# Patient Record
Sex: FEMALE | Race: WHITE | NOT HISPANIC OR LATINO | Employment: OTHER | URBAN - METROPOLITAN AREA
[De-identification: names, ages, dates, MRNs, and addresses within clinical notes are randomized per-mention and may not be internally consistent; named-entity substitution may affect disease eponyms.]

---

## 2017-01-27 ENCOUNTER — APPOINTMENT (OUTPATIENT)
Dept: CARDIAC REHAB | Facility: HOSPITAL | Age: 77
End: 2017-01-27

## 2017-01-30 ENCOUNTER — APPOINTMENT (OUTPATIENT)
Dept: CARDIAC REHAB | Facility: HOSPITAL | Age: 77
End: 2017-01-30

## 2017-02-01 ENCOUNTER — APPOINTMENT (OUTPATIENT)
Dept: CARDIAC REHAB | Facility: HOSPITAL | Age: 77
End: 2017-02-01

## 2017-02-03 ENCOUNTER — APPOINTMENT (OUTPATIENT)
Dept: CARDIAC REHAB | Facility: HOSPITAL | Age: 77
End: 2017-02-03

## 2017-02-06 ENCOUNTER — APPOINTMENT (OUTPATIENT)
Dept: CARDIAC REHAB | Facility: HOSPITAL | Age: 77
End: 2017-02-06

## 2017-02-08 ENCOUNTER — APPOINTMENT (OUTPATIENT)
Dept: CARDIAC REHAB | Facility: HOSPITAL | Age: 77
End: 2017-02-08

## 2017-02-10 ENCOUNTER — APPOINTMENT (OUTPATIENT)
Dept: CARDIAC REHAB | Facility: HOSPITAL | Age: 77
End: 2017-02-10

## 2017-02-13 ENCOUNTER — APPOINTMENT (OUTPATIENT)
Dept: CARDIAC REHAB | Facility: HOSPITAL | Age: 77
End: 2017-02-13

## 2017-02-15 ENCOUNTER — APPOINTMENT (OUTPATIENT)
Dept: CARDIAC REHAB | Facility: HOSPITAL | Age: 77
End: 2017-02-15

## 2017-02-15 ENCOUNTER — OFFICE VISIT (OUTPATIENT)
Dept: CARDIOLOGY | Facility: CLINIC | Age: 77
End: 2017-02-15

## 2017-02-15 VITALS
HEART RATE: 80 BPM | WEIGHT: 197.6 LBS | HEIGHT: 63 IN | SYSTOLIC BLOOD PRESSURE: 116 MMHG | BODY MASS INDEX: 35.01 KG/M2 | DIASTOLIC BLOOD PRESSURE: 72 MMHG

## 2017-02-15 DIAGNOSIS — R53.83 MALAISE AND FATIGUE: ICD-10-CM

## 2017-02-15 DIAGNOSIS — R53.81 MALAISE AND FATIGUE: ICD-10-CM

## 2017-02-15 DIAGNOSIS — E78.00 PURE HYPERCHOLESTEROLEMIA: ICD-10-CM

## 2017-02-15 DIAGNOSIS — I50.23 ACUTE ON CHRONIC SYSTOLIC CONGESTIVE HEART FAILURE (HCC): ICD-10-CM

## 2017-02-15 DIAGNOSIS — I25.810 CORONARY ARTERY DISEASE INVOLVING CORONARY BYPASS GRAFT OF NATIVE HEART WITHOUT ANGINA PECTORIS: Primary | ICD-10-CM

## 2017-02-15 DIAGNOSIS — I10 ESSENTIAL HYPERTENSION: ICD-10-CM

## 2017-02-15 PROCEDURE — 99214 OFFICE O/P EST MOD 30 MIN: CPT | Performed by: INTERNAL MEDICINE

## 2017-02-15 RX ORDER — LORATADINE 10 MG/1
10 TABLET ORAL DAILY
COMMUNITY

## 2017-02-15 NOTE — PROGRESS NOTES
"Subjective:     Encounter Date:02/15/2017      Patient ID: Karen Cheema is a 76 y.o. female.    Chief Complaint: Hyperlipidemia; Follow-up; and Fatigue      PROBLEM LIST:  1. Coronary artery disease  a. Dyspnea, abnormal stress Cardiolite 9/16  b. Cardiac catheter left main and multivessel disease normal LVEF  c. CABG ×5, Fabian, 8/5/16 BERRY to LAD and SVG to diagonal, SVG to OM1, and SVG to PDA and RPL  2. Dyslipidemia  3. Diabetes, type II  4. Asthma  5. Hypothyroidism  6. Multiple drug intolerances  7. Surgical history  a. Cholecystectomy  b. Bladder surgery  c. CABG    History of Present Illness  Patient returns today for follow up with a history of coronary artery disease for new complaints of malaise fatigue.  Since her last visit, the patient is stopped doing cardiac rehabilitation because of \"worked for acute heart\".  She has \"done a lot of reading on my medications and my condition\", and believe she has issues with her heart still.  She has stopped taking her statin again to do myalgias.  Last LDL was 181.  She complains of increasing malaise and fatigue notes she sleeps all the time and can sleep 12-15 hours if left on her opted.  She states this is a new finding.  When she does get up and exert herself, she denies any exertional chest pain or dyspnea.  She has been limited by orthopedic issues.  GERD with increasing activities as musculoskeletal, requiring an emergency room visit..     Allergies   Allergen Reactions   • Fish-Derived Products      \"turned purple and lost my hair\"   • Avandia [Rosiglitazone] Swelling   • Ciprofloxacin Swelling   • Crestor [Rosuvastatin] Myalgia     Muscle stiffness   • Eggs Or Egg-Derived Products Nausea Only   • Iodine      After eating fish, \"turned purple\", lost hair--was told that she would be allergic to contrast dye as well   • Levaquin [Levofloxacin] Nausea And Vomiting and Swelling   • Lipitor [Atorvastatin] Myalgia     Muscle stiffness   • Pravastatin Myalgia   • " "Sulfa Antibiotics Nausea Only         Current Outpatient Prescriptions:   •  acyclovir (ZOVIRAX) 400 MG tablet, Take 400 mg by mouth every 4 (four) hours. Used when needed , Disp: , Rfl:   •  aspirin 325 MG EC tablet, Take 1 tablet by mouth daily., Disp: 90 tablet, Rfl: 3  •  Cholecalciferol (VITAMIN D3) 5000 UNITS tablet, Take 5,000 Units by mouth Daily., Disp: , Rfl:   •  clopidogrel (PLAVIX) 75 MG tablet, Take 1 tablet by mouth daily., Disp: 90 tablet, Rfl: 3  •  levothyroxine (SYNTHROID, LEVOTHROID) 125 MCG tablet, Take 137 mcg by mouth Every Morning., Disp: , Rfl:   •  loratadine (CLARITIN) 10 MG tablet, Take 10 mg by mouth Daily., Disp: , Rfl:   •  metFORMIN (GLUCOPHAGE) 1000 MG tablet, Take 1,000 mg by mouth 2 (two) times a day with meals., Disp: , Rfl:   •  metoprolol tartrate (LOPRESSOR) 25 MG tablet, TAKE 1 TABLET BY MOUTH TWICE DAILY, Disp: 120 tablet, Rfl: 0    The following portions of the patient's history were reviewed and updated as appropriate: allergies, current medications, past family history, past medical history, past social history, past surgical history and problem list.    Review of Systems   Constitution: Positive for malaise/fatigue.   Cardiovascular: Positive for chest pain and dyspnea on exertion.   Hematologic/Lymphatic: Negative for bleeding problem. Does not bruise/bleed easily.   Skin: Negative for rash.   Musculoskeletal: Positive for joint pain. Negative for muscle weakness and myalgias.   Gastrointestinal: Negative for heartburn, nausea and vomiting.   Neurological: Positive for excessive daytime sleepiness and numbness.          Objective:   Blood pressure 116/72, pulse 80, height 63\" (160 cm), weight 197 lb 9.6 oz (89.6 kg).      Physical Exam   Constitutional: She is oriented to person, place, and time. She appears well-developed and well-nourished.   HENT:   Mouth/Throat: Oropharynx is clear and moist.   Neck: No JVD present. Carotid bruit is not present. No thyromegaly " "present.   Cardiovascular: Regular rhythm, S1 normal, S2 normal, normal heart sounds and intact distal pulses.  Exam reveals no gallop, no S3 and no S4.    No murmur heard.  Pulses:       Carotid pulses are 2+ on the right side, and 2+ on the left side.       Radial pulses are 2+ on the right side, and 2+ on the left side.   Pulmonary/Chest: Breath sounds normal.   Abdominal: Soft. Bowel sounds are normal. She exhibits no mass. There is no tenderness.   Musculoskeletal: She exhibits no edema.   Neurological: She is alert and oriented to person, place, and time.   Skin: Skin is warm and dry. No rash noted.       Lab Review:    Procedures        Assessment:   Karen was seen today for hyperlipidemia, follow-up and fatigue.    Diagnoses and all orders for this visit:    Coronary artery disease involving coronary bypass graft of native heart without angina pectoris    Pure hypercholesterolemia    Essential hypertension    Malaise and fatigue        Impression  1. Coronary artery disease, 6 months post CABG  2. Dyslipidemia intolerant to statins  3. Hypertension controlled  4. Malaise fatigue, unclear etiology.  Possibly beta blocker related, as patient thinks.  She is also concerned this is \"anginal equivalent\".    Plan:  1. Exercise Cardiolite myocardial perfusion study  2. Decrease metoprolol to 12.5 minutes twice a day, and after 2-3 weeks discontinue.  3. Given options of trying Livalo/CoQ10 versus trying a new PC SK 9 inhibitor.  For now, we will not add another medicine and attempt to get a PCS K9 inhibitor covered.  4. Revisit in6MO, or sooner as needed.    Anthony Marmolejo MD    "

## 2017-02-17 ENCOUNTER — APPOINTMENT (OUTPATIENT)
Dept: CARDIAC REHAB | Facility: HOSPITAL | Age: 77
End: 2017-02-17

## 2017-02-20 ENCOUNTER — APPOINTMENT (OUTPATIENT)
Dept: CARDIAC REHAB | Facility: HOSPITAL | Age: 77
End: 2017-02-20

## 2017-02-22 ENCOUNTER — OUTSIDE FACILITY SERVICE (OUTPATIENT)
Dept: CARDIOLOGY | Facility: CLINIC | Age: 77
End: 2017-02-22

## 2017-02-22 ENCOUNTER — APPOINTMENT (OUTPATIENT)
Dept: CARDIAC REHAB | Facility: HOSPITAL | Age: 77
End: 2017-02-22

## 2017-02-22 PROCEDURE — 93018 CV STRESS TEST I&R ONLY: CPT | Performed by: INTERNAL MEDICINE

## 2017-02-22 PROCEDURE — 78452 HT MUSCLE IMAGE SPECT MULT: CPT | Performed by: INTERNAL MEDICINE

## 2017-03-10 RX ORDER — CLOPIDOGREL BISULFATE 75 MG/1
TABLET ORAL
Qty: 30 TABLET | Refills: 0 | Status: SHIPPED | OUTPATIENT
Start: 2017-03-10 | End: 2017-04-09 | Stop reason: SDUPTHER

## 2017-03-10 RX ORDER — ASPIRIN 325 MG
TABLET, DELAYED RELEASE (ENTERIC COATED) ORAL
Qty: 30 TABLET | Refills: 0 | Status: SHIPPED | OUTPATIENT
Start: 2017-03-10 | End: 2017-04-10 | Stop reason: SDUPTHER

## 2017-04-10 RX ORDER — CLOPIDOGREL BISULFATE 75 MG/1
TABLET ORAL
Qty: 30 TABLET | Refills: 11 | Status: SHIPPED | OUTPATIENT
Start: 2017-04-10 | End: 2018-02-02 | Stop reason: SDUPTHER

## 2017-04-12 RX ORDER — ASPIRIN 325 MG
TABLET, DELAYED RELEASE (ENTERIC COATED) ORAL
Qty: 30 TABLET | Refills: 0 | Status: SHIPPED | OUTPATIENT
Start: 2017-04-12 | End: 2017-05-05 | Stop reason: SDUPTHER

## 2017-05-08 RX ORDER — ASPIRIN 325 MG
TABLET, DELAYED RELEASE (ENTERIC COATED) ORAL
Qty: 30 TABLET | Refills: 0 | Status: SHIPPED | OUTPATIENT
Start: 2017-05-08 | End: 2018-02-12 | Stop reason: SDUPTHER

## 2017-08-23 ENCOUNTER — OFFICE VISIT (OUTPATIENT)
Dept: CARDIOLOGY | Facility: CLINIC | Age: 77
End: 2017-08-23

## 2017-08-23 VITALS
SYSTOLIC BLOOD PRESSURE: 138 MMHG | DIASTOLIC BLOOD PRESSURE: 82 MMHG | WEIGHT: 202.6 LBS | BODY MASS INDEX: 35.9 KG/M2 | HEIGHT: 63 IN | HEART RATE: 59 BPM

## 2017-08-23 DIAGNOSIS — R53.83 MALAISE AND FATIGUE: ICD-10-CM

## 2017-08-23 DIAGNOSIS — R53.81 MALAISE AND FATIGUE: ICD-10-CM

## 2017-08-23 DIAGNOSIS — E78.00 PURE HYPERCHOLESTEROLEMIA: Primary | ICD-10-CM

## 2017-08-23 DIAGNOSIS — I10 ESSENTIAL HYPERTENSION: ICD-10-CM

## 2017-08-23 DIAGNOSIS — I25.810 CORONARY ARTERY DISEASE INVOLVING CORONARY BYPASS GRAFT OF NATIVE HEART WITHOUT ANGINA PECTORIS: ICD-10-CM

## 2017-08-23 PROCEDURE — 99214 OFFICE O/P EST MOD 30 MIN: CPT | Performed by: INTERNAL MEDICINE

## 2017-08-23 RX ORDER — AMPICILLIN TRIHYDRATE 250 MG
500 CAPSULE ORAL DAILY
COMMUNITY
End: 2018-08-29

## 2017-08-23 RX ORDER — UBIDECARENONE 100 MG
200 CAPSULE ORAL DAILY
COMMUNITY

## 2017-08-23 NOTE — PROGRESS NOTES
"Subjective:     Encounter Date:2017      Patient ID: Karen Cheema is a 77 y.o. female.    Chief Complaint:Coronary Artery Disease; abnormal stress test; and Rapid Heart Rate (during the night sometimes)    PROBLEM LIST:  1. Coronary artery disease  a. Dyspnea, abnormal stress Cardiolite   b. Cardiac catheter left main and multivessel disease normal LVEF  c. CABG ×5, Fabian, 16 BERRY to LAD and SVG to diagonal, SVG to OM1, and SVG to PDA and RPL  2. Dyslipidemia  3. Diabetes, type II  4. Asthma  5. Hypothyroidism  6. Rosacea  7. Multiple drug intolerances  8. Surgical history  a. Cholecystectomy  b. Bladder surgery  c. CABG  d.  section  e. Hysterectomy  f. Tonsillectomy         Allergies   Allergen Reactions   • Fish-Derived Products      \"turned purple and lost my hair\"   • Avandia [Rosiglitazone] Swelling   • Ciprofloxacin Swelling   • Crestor [Rosuvastatin] Myalgia     Muscle stiffness   • Eggs Or Egg-Derived Products Nausea Only   • Iodine      After eating fish, \"turned purple\", lost hair--was told that she would be allergic to contrast dye as well   • Levaquin [Levofloxacin] Nausea And Vomiting and Swelling   • Lipitor [Atorvastatin] Myalgia     Muscle stiffness   • Pravastatin Myalgia   • Sulfa Antibiotics Nausea Only         Current Outpatient Prescriptions:   •  acyclovir (ZOVIRAX) 400 MG tablet, Take 400 mg by mouth every 4 (four) hours. Used when needed , Disp: , Rfl:   •  aspirin  MG tablet, TAKE 1 TABLET BY MOUTH EVERY DAY, Disp: 30 tablet, Rfl: 0  •  Cholecalciferol (VITAMIN D3) 5000 UNITS tablet, Take 5,000 Units by mouth Daily., Disp: , Rfl:   •  Cinnamon 500 MG capsule, Take 500 mg by mouth Daily., Disp: , Rfl:   •  clopidogrel (PLAVIX) 75 MG tablet, TAKE 1 TABLET BY MOUTH EVERY DAY, Disp: 30 tablet, Rfl: 11  •  coenzyme Q10 100 MG capsule, Take 200 mg by mouth Daily., Disp: , Rfl:   •  levothyroxine (SYNTHROID, LEVOTHROID) 125 MCG tablet, Take 137 mcg by mouth Every " "Morning., Disp: , Rfl:   •  loratadine (CLARITIN) 10 MG tablet, Take 10 mg by mouth Daily., Disp: , Rfl:   •  metFORMIN (GLUCOPHAGE) 1000 MG tablet, Take 1,000 mg by mouth 2 (two) times a day with meals., Disp: , Rfl:   •  metoprolol tartrate (LOPRESSOR) 25 MG tablet, Take 0.5 tablets by mouth 2 (Two) Times a Day., Disp: 45 tablet, Rfl: 3        History of Present Illness    Annual follow-up one year status post CABG.  She underwent a stress perfusion study 6 months ago which was normal.  She still continued complains of malaise fatigue is her primary complaint.  She has not been particularly active which she says due to \"being tired all the time\".  Denies any other sleep apnea symptoms.  She has had difficulties getting her [employer] [facility] facility noticed a prudent approved by her insurance.  5 apnea PND dizziness presyncope or syncope    The following portions of the patient's history were reviewed and updated as appropriate: allergies, current medications, past family history, past medical history, past social history, past surgical history and problem list.      Social History   Substance Use Topics   • Smoking status: Never Smoker   • Smokeless tobacco: Never Used   • Alcohol use No         Review of Systems   Constitution: Positive for malaise/fatigue.   Cardiovascular: Negative.    Respiratory: Negative.    Hematologic/Lymphatic: Negative for bleeding problem. Does not bruise/bleed easily.   Skin: Negative for rash.   Musculoskeletal: Positive for joint pain. Negative for muscle weakness and myalgias.   Gastrointestinal: Negative for heartburn, nausea and vomiting.   Neurological: Negative.           Objective:    height is 63\" (160 cm) and weight is 202 lb 9.6 oz (91.9 kg). Her blood pressure is 138/82 and her pulse is 59.         Physical Exam   Constitutional: She is oriented to person, place, and time. She appears well-developed and well-nourished.   HENT:   Mouth/Throat: Oropharynx is clear and moist.   Neck: No " JVD present. Carotid bruit is not present.   Cardiovascular: Regular rhythm, S1 normal, S2 normal, normal heart sounds and intact distal pulses.  Exam reveals no gallop, no S3 and no S4.    No murmur heard.  Pulses:       Carotid pulses are 2+ on the right side, and 2+ on the left side.       Radial pulses are 2+ on the right side, and 2+ on the left side.   Pulmonary/Chest: Breath sounds normal.   Abdominal: Soft. Bowel sounds are normal. She exhibits no mass. There is no tenderness.   Musculoskeletal: She exhibits no edema.   Neurological: She is alert and oriented to person, place, and time.   Skin: Skin is warm and dry. No rash noted.       Procedures          Assessment:   Assessment/Plan      Karen was seen today for coronary artery disease, abnormal stress test and rapid heart rate.    Diagnoses and all orders for this visit:    Pure hypercholesterolemia    Essential hypertension    Coronary artery disease involving coronary bypass graft of native heart without angina pectoris    Malaise and fatigue      1.  One year status post CABG.  No angina.  2.  Dyslipidemia.  Intolerant to all statins.  PCS K9 is been improved, she has somewhat confused as to how to actually obtain the medicine however.  We have instructed her how to obtain this medicine from the specialty pharmacy.  3.  Malaise fatigue unclear etiology, discussed the possible sleep apnea evaluation which she defers at this time.  We will stop her metoprolol.       Shi MENDES scribed portions of this dictation for  Dr. Marmolejo.  I, Anthony Marmolejo MD, personally performed the services described in this documentation as scribed by the above individual in my presence, and it is both accurate and complete  Dictated utilizing Dragon dictation

## 2017-12-19 ENCOUNTER — TELEPHONE (OUTPATIENT)
Dept: CARDIOLOGY | Facility: CLINIC | Age: 77
End: 2017-12-19

## 2017-12-19 NOTE — TELEPHONE ENCOUNTER
Patient called and says that since on the Praluent she has had trouble keeping her HgA1c down.   She used to be able to keep it around 6 and now it goes up to 8 and she has a really hard time getting it down.   Wonders if she can take the shot 1xmonth instead of 2 times X month.  Just had lab work done and is sending it to me for you to see.

## 2017-12-21 NOTE — TELEPHONE ENCOUNTER
Patient informed or Jose Marmolejo's response to her question about her sugar being up on the Praluent and to change her shots to 1x month instead of 2 times and he says that they are unrelated and to continue on the shots 2xmonth.  She still feels that they are related.

## 2018-02-02 RX ORDER — CLOPIDOGREL BISULFATE 75 MG/1
TABLET ORAL
Qty: 30 TABLET | Refills: 1 | Status: SHIPPED | OUTPATIENT
Start: 2018-02-02 | End: 2018-02-08 | Stop reason: SDUPTHER

## 2018-02-07 ENCOUNTER — OFFICE VISIT (OUTPATIENT)
Dept: CARDIOLOGY | Facility: CLINIC | Age: 78
End: 2018-02-07

## 2018-02-07 VITALS
DIASTOLIC BLOOD PRESSURE: 84 MMHG | SYSTOLIC BLOOD PRESSURE: 122 MMHG | BODY MASS INDEX: 37.91 KG/M2 | HEART RATE: 77 BPM | HEIGHT: 62 IN | WEIGHT: 206 LBS

## 2018-02-07 DIAGNOSIS — I25.118 CORONARY ARTERY DISEASE INVOLVING NATIVE CORONARY ARTERY OF NATIVE HEART WITH OTHER FORM OF ANGINA PECTORIS (HCC): ICD-10-CM

## 2018-02-07 DIAGNOSIS — I10 ESSENTIAL HYPERTENSION: ICD-10-CM

## 2018-02-07 DIAGNOSIS — R07.2 PRECORDIAL PAIN: Primary | ICD-10-CM

## 2018-02-07 DIAGNOSIS — E78.5 DYSLIPIDEMIA: ICD-10-CM

## 2018-02-07 PROCEDURE — 99214 OFFICE O/P EST MOD 30 MIN: CPT | Performed by: NURSE PRACTITIONER

## 2018-02-07 RX ORDER — VITAMIN B COMPLEX
CAPSULE ORAL DAILY
COMMUNITY
End: 2019-09-11

## 2018-02-07 NOTE — PROGRESS NOTES
"Subjective:     Encounter Date:2018    Primary Care Physician: VAUGHN Layton      Patient ID: Karen Cheema is a 77 y.o. female.    Chief Complaint:Coronary Artery Disease; abnormal stress test; Hypertension; and Shortness of Breath    PROBLEM LIST:  1. Coronary artery disease  a. Dyspnea, abnormal stress Cardiolite   b. Cardiac catheter left main and multivessel disease normal LVEF  c. CABG ×5, Fabian, 16 BERRY to LAD and SVG to diagonal, SVG to OM1, and SVG to PDA and RPL  2. Dyslipidemia  3. Diabetes, type II  4. Asthma  5. Hypothyroidism  6. Rosacea  7. Multiple drug intolerances  8. Surgical history  a. Cholecystectomy  b. Bladder surgery  c. CABG  d.  section  e. Hysterectomy  f. Tonsillectomy     Allergies   Allergen Reactions   • Fish-Derived Products      \"turned purple and lost my hair\"   • Avandia [Rosiglitazone] Swelling   • Ciprofloxacin Swelling   • Crestor [Rosuvastatin] Myalgia     Muscle stiffness   • Eggs Or Egg-Derived Products Nausea Only   • Iodine      After eating fish, \"turned purple\", lost hair--was told that she would be allergic to contrast dye as well   • Levaquin [Levofloxacin] Nausea And Vomiting and Swelling   • Lipitor [Atorvastatin] Myalgia     Muscle stiffness   • Pravastatin Myalgia   • Sulfa Antibiotics Nausea Only         Current Outpatient Prescriptions:   •  aspirin  MG tablet, TAKE 1 TABLET BY MOUTH EVERY DAY, Disp: 30 tablet, Rfl: 0  •  B Complex Vitamins (VITAMIN B COMPLEX) capsule capsule, Take  by mouth Daily., Disp: , Rfl:   •  Cholecalciferol (VITAMIN D3) 5000 UNITS tablet, Take 5,000 Units by mouth Daily., Disp: , Rfl:   •  clopidogrel (PLAVIX) 75 MG tablet, TAKE ONE TABLET BY MOUTH DAILY, Disp: 30 tablet, Rfl: 1  •  coenzyme Q10 100 MG capsule, Take 200 mg by mouth Daily., Disp: , Rfl:   •  levothyroxine (SYNTHROID, LEVOTHROID) 125 MCG tablet, Take 137 mcg by mouth Every Morning., Disp: , Rfl:   •  loratadine (CLARITIN) 10 MG " tablet, Take 10 mg by mouth Daily., Disp: , Rfl:   •  metFORMIN (GLUCOPHAGE) 1000 MG tablet, Take 1,000 mg by mouth 2 (two) times a day with meals., Disp: , Rfl:   •  acyclovir (ZOVIRAX) 400 MG tablet, Take 400 mg by mouth every 4 (four) hours. Used when needed , Disp: , Rfl:   •  Cinnamon 500 MG capsule, Take 500 mg by mouth Daily., Disp: , Rfl:   •  metoprolol tartrate (LOPRESSOR) 25 MG tablet, Take 0.5 tablets by mouth 2 (Two) Times a Day., Disp: 45 tablet, Rfl: 3        History of Present Illness    Patient is a 77-year-old  female who is following up today with complaints of chest pain.  She has previous history of coronary artery bypass grafting which was performed in August 2016.  She notes that couple months ago her diabetes medications were changed.  After this she began to have reaction of upper body edema.  She experienced associated chest tightness as well as shortness of breath.  This medication was eventually discontinued.  Since that time she notes that her chest discomfort has been improving.  She also notes that her shortness of breath has been improving.  This does not necessarily feel he can previous angina but she is concerned about her bypass grafts secondary to the discomfort.  No syncope, near-syncope, or worsening edema.  States that she is compliant with her medications.  She also notes that she has started going to the gym 6 days a week.  She is been primarily doing cardio workouts.  She has questions about beginning some upper body resistance training.    The following portions of the patient's history were reviewed and updated as appropriate: allergies, current medications, past family history, past medical history, past social history, past surgical history and problem list.    Social History   Substance Use Topics   • Smoking status: Never Smoker   • Smokeless tobacco: Never Used   • Alcohol use No         Review of Systems   Constitution: Negative.   Cardiovascular: Positive  "for chest pain.   Respiratory: Positive for shortness of breath.    Hematologic/Lymphatic: Negative for bleeding problem. Does not bruise/bleed easily.   Skin: Negative for rash.   Musculoskeletal: Negative for muscle weakness and myalgias.   Gastrointestinal: Negative for heartburn, nausea and vomiting.   Neurological: Negative.           Objective:    height is 157.5 cm (62\") and weight is 93.4 kg (206 lb). Her blood pressure is 122/84 and her pulse is 77.         Physical Exam   Constitutional: She is oriented to person, place, and time. She appears well-developed and well-nourished. No distress.   Neck: No JVD present. No tracheal deviation present.   No bruit auscultated bilaterally.   Cardiovascular: Normal rate, regular rhythm and normal heart sounds.  Exam reveals no friction rub.    No murmur heard.  Pulmonary/Chest: Effort normal and breath sounds normal. No respiratory distress.   Abdominal: Soft. Bowel sounds are normal. There is no tenderness.   Musculoskeletal: She exhibits no edema or deformity.   Neurological: She is alert and oriented to person, place, and time.   Skin: Skin is warm and dry.       Procedures          Assessment:   Assessment/Plan      Karen was seen today for coronary artery disease, abnormal stress test, hypertension and shortness of breath.    Diagnoses and all orders for this visit:    Precordial pain, Noted history of coronary disease.  -     Stress Test With Myocardial Perfusion (1 Day); Future    Coronary artery disease involving native coronary artery of native heart with other form of angina pectoris  -     Stress Test With Myocardial Perfusion (1 Day); Future    Essential hypertension, controlled.    Dyslipidemia, statin intolerant.  Most recent LDL in December 2017 of 69 on Praluent.      Plan:    At this time we'll make no changes in the patient's medication regimen.  To further evaluate her recent symptoms we'll train a myocardial perfusion study at her convenience.  " We'll see her back in 6 months time or sooner if needed.       Shi MENDES     Dictated utilizing Dragon dictation

## 2018-02-08 RX ORDER — CLOPIDOGREL BISULFATE 75 MG/1
75 TABLET ORAL DAILY
Qty: 30 TABLET | Refills: 1 | Status: SHIPPED | OUTPATIENT
Start: 2018-02-08 | End: 2018-02-12 | Stop reason: SDUPTHER

## 2018-02-12 RX ORDER — ASPIRIN 325 MG
325 TABLET, DELAYED RELEASE (ENTERIC COATED) ORAL DAILY
Qty: 90 TABLET | Refills: 3 | Status: SHIPPED | OUTPATIENT
Start: 2018-02-12 | End: 2018-08-29 | Stop reason: SDUPTHER

## 2018-02-12 RX ORDER — CLOPIDOGREL BISULFATE 75 MG/1
75 TABLET ORAL DAILY
Qty: 90 TABLET | Refills: 3 | Status: SHIPPED | OUTPATIENT
Start: 2018-02-12 | End: 2018-08-13 | Stop reason: SDUPTHER

## 2018-02-13 ENCOUNTER — TELEPHONE (OUTPATIENT)
Dept: CARDIOLOGY | Facility: CLINIC | Age: 78
End: 2018-02-13

## 2018-02-13 NOTE — TELEPHONE ENCOUNTER
Patient called and reports that there was a medication at the pharmacy that she wasn't familiar with.  Called patient back and she states she has not been taking metoprolol.  She questions if she needs it, I advised her that Dr. Marmolejo is under the impression she is on it.  I advised her to check her blood pressure for 7 days and to call back with those readings and we will go from there.  She verbalized understanding and will call back with those readings.

## 2018-02-28 ENCOUNTER — OUTSIDE FACILITY SERVICE (OUTPATIENT)
Dept: CARDIOLOGY | Facility: CLINIC | Age: 78
End: 2018-02-28

## 2018-02-28 PROCEDURE — 78452 HT MUSCLE IMAGE SPECT MULT: CPT | Performed by: INTERNAL MEDICINE

## 2018-02-28 PROCEDURE — 93018 CV STRESS TEST I&R ONLY: CPT | Performed by: INTERNAL MEDICINE

## 2018-03-06 ENCOUNTER — TELEPHONE (OUTPATIENT)
Dept: CARDIOLOGY | Facility: CLINIC | Age: 78
End: 2018-03-06

## 2018-08-13 RX ORDER — CLOPIDOGREL BISULFATE 75 MG/1
TABLET ORAL
Qty: 30 TABLET | Refills: 0 | Status: SHIPPED | OUTPATIENT
Start: 2018-08-13 | End: 2018-08-29 | Stop reason: SDUPTHER

## 2018-08-13 RX ORDER — CLOPIDOGREL BISULFATE 75 MG/1
75 TABLET ORAL DAILY
Qty: 90 TABLET | Refills: 3 | Status: SHIPPED | OUTPATIENT
Start: 2018-08-13 | End: 2018-08-29 | Stop reason: SDUPTHER

## 2018-08-13 NOTE — ADDENDUM NOTE
Addended by: HIPOLITO SAHA on: 8/13/2018 05:05 PM     Modules accepted: Level of Service, SmartSet

## 2018-08-13 NOTE — ADDENDUM NOTE
Addended by: HIPOLITO SAHA on: 8/13/2018 05:07 PM     Modules accepted: Level of Service, SmartSet

## 2018-08-29 ENCOUNTER — OFFICE VISIT (OUTPATIENT)
Dept: CARDIOLOGY | Facility: CLINIC | Age: 78
End: 2018-08-29

## 2018-08-29 VITALS
HEIGHT: 61 IN | HEART RATE: 67 BPM | WEIGHT: 193 LBS | DIASTOLIC BLOOD PRESSURE: 80 MMHG | SYSTOLIC BLOOD PRESSURE: 120 MMHG | BODY MASS INDEX: 36.44 KG/M2

## 2018-08-29 DIAGNOSIS — I10 ESSENTIAL HYPERTENSION: ICD-10-CM

## 2018-08-29 DIAGNOSIS — E78.00 PURE HYPERCHOLESTEROLEMIA: ICD-10-CM

## 2018-08-29 DIAGNOSIS — I25.810 CORONARY ARTERY DISEASE INVOLVING CORONARY BYPASS GRAFT OF NATIVE HEART WITHOUT ANGINA PECTORIS: Primary | ICD-10-CM

## 2018-08-29 PROCEDURE — 99214 OFFICE O/P EST MOD 30 MIN: CPT | Performed by: INTERNAL MEDICINE

## 2018-08-29 RX ORDER — ASPIRIN 325 MG
325 TABLET, DELAYED RELEASE (ENTERIC COATED) ORAL DAILY
Qty: 90 TABLET | Refills: 3 | Status: SHIPPED | OUTPATIENT
Start: 2018-08-29 | End: 2019-09-11 | Stop reason: DRUGHIGH

## 2018-08-29 RX ORDER — CLOPIDOGREL BISULFATE 75 MG/1
75 TABLET ORAL DAILY
Qty: 90 TABLET | Refills: 3 | Status: SHIPPED | OUTPATIENT
Start: 2018-08-29 | End: 2019-10-20 | Stop reason: SDUPTHER

## 2018-08-29 RX ORDER — LEVOTHYROXINE SODIUM 137 UG/1
137 TABLET ORAL DAILY
COMMUNITY

## 2018-08-29 NOTE — PROGRESS NOTES
"Subjective:     Encounter Date:2018      Patient ID: Karen Cheema is a 78 y.o. female.    Chief Complaint: Coronary artery disease involving coronary bypass graft of n      PROBLEM LIST:  1. Coronary artery disease  a. Dyspnea, abnormal stress Cardiolite   b. Cardiac catheter left main and multivessel disease normal LVEF  c. CABG ×5, Fabian, 16 BERRY to LAD and SVG to diagonal, SVG to OM1, and SVG to PDA and RPL  2. Dyslipidemia  3. Diabetes, type II  4. Asthma  5. Hypothyroidism  6. Rosacea  7. Multiple drug intolerances  8. Surgical history  a. Cholecystectomy  b. Bladder surgery  c. CABG  d.  section  e. Hysterectomy  f. Tonsillectomy   1.     History of Present Illness  Patient returns today for follow up with a history of Annual follow-up for coronary artery disease.  She is 2 years post CABG and overall feels well.  She is active, has no chest pain shortness breath orthopnea dizziness presyncope palpitations edema or claudication.  Her diabetes is better controlled.  Her lipids are well-controlled on her PCS K9 inhibitor..     Allergies   Allergen Reactions   • Fish-Derived Products      \"turned purple and lost my hair\"   • Avandia [Rosiglitazone] Swelling   • Ciprofloxacin Swelling   • Crestor [Rosuvastatin] Myalgia     Muscle stiffness   • Eggs Or Egg-Derived Products Nausea Only   • Glyburide Swelling   • Iodine      After eating fish, \"turned purple\", lost hair--was told that she would be allergic to contrast dye as well   • Levaquin [Levofloxacin] Nausea And Vomiting and Swelling   • Lipitor [Atorvastatin] Myalgia     Muscle stiffness   • Pravastatin Myalgia   • Sulfa Antibiotics Nausea Only         Current Outpatient Prescriptions:   •  aspirin  MG tablet, Take 1 tablet by mouth Daily., Disp: 90 tablet, Rfl: 3  •  B Complex Vitamins (VITAMIN B COMPLEX) capsule capsule, Take  by mouth Daily., Disp: , Rfl:   •  Cholecalciferol (VITAMIN D3) 5000 UNITS tablet, Take 5,000 Units by " "mouth Daily., Disp: , Rfl:   •  clopidogrel (PLAVIX) 75 MG tablet, Take 1 tablet by mouth Daily., Disp: 90 tablet, Rfl: 3  •  coenzyme Q10 100 MG capsule, Take 200 mg by mouth Daily., Disp: , Rfl:   •  levothyroxine (SYNTHROID, LEVOTHROID) 137 MCG tablet, Take 137 mcg by mouth Daily., Disp: , Rfl:   •  loratadine (CLARITIN) 10 MG tablet, Take 10 mg by mouth Daily., Disp: , Rfl:   •  metFORMIN (GLUCOPHAGE) 1000 MG tablet, Take 1,000 mg by mouth 2 (two) times a day with meals., Disp: , Rfl:   •  metoprolol tartrate (LOPRESSOR) 25 MG tablet, Take 0.5 tablets by mouth Every 12 (Twelve) Hours. (Patient taking differently: Take 12.5 mg by mouth Daily.), Disp: 90 tablet, Rfl: 2  •  PRALUENT 75 MG/ML solution pen-injector, INJECT ONE PEN UNDER THE SKIN EVERY 2 WEEKS(14 DAYS) AS DIRECTED, Disp: 2 pen, Rfl: 11    The following portions of the patient's history were reviewed and updated as appropriate: allergies, current medications, past family history, past medical history, past social history, past surgical history and problem list.    Review of Systems   Constitution: Negative.   HENT: Positive for ear pain.    Cardiovascular: Negative.    Respiratory: Negative.    Hematologic/Lymphatic: Negative for bleeding problem. Does not bruise/bleed easily.   Skin: Negative for rash.   Musculoskeletal: Negative for muscle weakness and myalgias.   Gastrointestinal: Negative for heartburn, nausea and vomiting.   Neurological: Negative.           Objective:   Blood pressure 120/80, pulse 67, height 154.9 cm (61\"), weight 87.5 kg (193 lb).      Physical Exam   Constitutional: She is oriented to person, place, and time. She appears well-developed and well-nourished.   HENT:   Mouth/Throat: Oropharynx is clear and moist.   Neck: No JVD present. Carotid bruit is not present. No thyromegaly present.   Cardiovascular: Regular rhythm, S1 normal, S2 normal, normal heart sounds and intact distal pulses.  Exam reveals no gallop, no S3 and no S4. "    No murmur heard.  Pulses:       Carotid pulses are 2+ on the right side, and 2+ on the left side.       Radial pulses are 2+ on the right side, and 2+ on the left side.   Pulmonary/Chest: Breath sounds normal.   Abdominal: Soft. Bowel sounds are normal. She exhibits no mass. There is no tenderness.   Musculoskeletal: She exhibits no edema.   Neurological: She is alert and oriented to person, place, and time.   Skin: Skin is warm and dry. No rash noted.       Lab Review:    Procedures        Assessment:   Karen was seen today for coronary artery disease involving coronary bypass graft of n.    Diagnoses and all orders for this visit:    Coronary artery disease involving coronary bypass graft of native heart without angina pectoris    Pure hypercholesterolemia    Essential hypertension        Impression  1. Coronary artery disease, 2 years post CABG.  Stable no angina  Hyperlipidemia.  LDL 66 on PSK9 inhibitor  3.  Hypertension controlled  4.  Preoperative shoulder surgery, low cardiovascular risk.      Plan:  1. Patient low cardiovascular risk for upcoming shoulder surgery may proceed as planned.  If necessary may hold Plavix for 5-7 days perioperatively  2. Otherwise continue current medical therapy  3. Revisit in 12 MO, or sooner as needed.    Anthony Marmolejo MD

## 2019-01-24 ENCOUNTER — TELEPHONE (OUTPATIENT)
Dept: CARDIOLOGY | Facility: CLINIC | Age: 79
End: 2019-01-24

## 2019-01-24 NOTE — TELEPHONE ENCOUNTER
Called to see if patient has had recent lipid panel there, left message.  Left VM for patient requesting info if she has had recent lipid panel.  These calls are in response to communication from [facility] for renewal of Praluent.

## 2019-09-10 NOTE — PROGRESS NOTES
"Subjective:     Encounter Date:2019    Primary Care Physician: Darcy Parry APRN      Patient ID: Karen Cheema is a 79 y.o. female.    Chief Complaint:Follow-up    PROBLEM LIST:  1. Coronary artery disease  a. Dyspnea, abnormal stress Cardiolite   b. Cardiac catheter left main and multivessel disease normal LVEF  c. CABG ×5, Fabian, 16 BERRY to LAD and SVG to diagonal, SVG to OM1, and SVG to PDA and RPL  d.  MPS negative for ischemia  2. Dyslipidemia  3. Diabetes, type II  4. Asthma  5. Hypothyroidism  6. Rosacea  7. Multiple drug intolerances  8. Surgical history  a. Cholecystectomy  b. Bladder surgery  c. CABG  d.  section  e. Hysterectomy        Allergies   Allergen Reactions   • Fish-Derived Products      \"turned purple and lost my hair\"   • Avandia [Rosiglitazone] Swelling   • Ciprofloxacin Swelling   • Crestor [Rosuvastatin] Myalgia     Muscle stiffness   • Eggs Or Egg-Derived Products Nausea Only   • Glyburide Swelling   • Iodine      After eating fish, \"turned purple\", lost hair--was told that she would be allergic to contrast dye as well   • Levaquin [Levofloxacin] Nausea And Vomiting and Swelling   • Lipitor [Atorvastatin] Myalgia     Muscle stiffness   • Pravastatin Myalgia   • Sulfa Antibiotics Nausea Only         Current Outpatient Medications:   •  aspirin  MG tablet, Take 1 tablet by mouth Daily., Disp: 90 tablet, Rfl: 3  •  Cholecalciferol (VITAMIN D3) 5000 UNITS tablet, Take 5,000 Units by mouth Daily., Disp: , Rfl:   •  clopidogrel (PLAVIX) 75 MG tablet, Take 1 tablet by mouth Daily., Disp: 90 tablet, Rfl: 3  •  coenzyme Q10 100 MG capsule, Take 200 mg by mouth Daily., Disp: , Rfl:   •  levothyroxine (SYNTHROID, LEVOTHROID) 137 MCG tablet, Take 137 mcg by mouth Daily., Disp: , Rfl:   •  loratadine (CLARITIN) 10 MG tablet, Take 10 mg by mouth Daily., Disp: , Rfl:   •  metFORMIN (GLUCOPHAGE) 1000 MG tablet, Take 1,000 mg by mouth 2 (two) times a day with " "meals., Disp: , Rfl:   •  metoprolol tartrate (LOPRESSOR) 25 MG tablet, TAKE ONE-HALF TABLET BY MOUTH EVERY 12 HOURS, Disp: 90 tablet, Rfl: 1  •  PRALUENT 75 MG/ML solution pen-injector, INJECT ONE PEN UNDER THE SKIN EVERY 2 WEEKS AS DIRECTED, Disp: 2 pen, Rfl: 11        History of Present Illness    Patient returns today for annual follow-up of coronary artery disease.  Since last being seen she notes to overall be doing well.  She denies any chest pain, pressure, tightness.  Denies any increased shortness of breath.  No syncope, near-syncope, or edema.  She states that she is compliant with her medications.  Her routine blood work including lipid panel are followed with her primary care physician.  She does note some easy bruising.  She is still taking aspirin 325 mg daily.    The following portions of the patient's history were reviewed and updated as appropriate: allergies, current medications, past family history, past medical history, past social history, past surgical history and problem list.      Social History     Tobacco Use   • Smoking status: Never Smoker   • Smokeless tobacco: Never Used   Substance Use Topics   • Alcohol use: No   • Drug use: No         Review of Systems   Constitution: Negative.   Cardiovascular: Negative.    Respiratory: Positive for wheezing.    Hematologic/Lymphatic: Negative for bleeding problem. Bruises/bleeds easily.   Skin: Negative for rash.   Musculoskeletal: Negative for muscle weakness and myalgias.   Gastrointestinal: Negative for heartburn, nausea and vomiting.   Neurological: Negative.    Allergic/Immunologic: Positive for environmental allergies.          Objective:    height is 160 cm (63\") and weight is 84.4 kg (186 lb). Her blood pressure is 130/80 and her pulse is 63. Her oxygen saturation is 98%.         Physical Exam   Constitutional: She is oriented to person, place, and time. She appears well-developed and well-nourished.   Neck: No JVD present. No tracheal " deviation present.   Cardiovascular: Normal rate and regular rhythm. Exam reveals no friction rub.   Murmur (1/6 SARAH) heard.  Pulmonary/Chest: Effort normal and breath sounds normal. No respiratory distress.   Abdominal: Soft. Bowel sounds are normal. There is no tenderness.   Musculoskeletal: She exhibits no edema or deformity.   Neurological: She is alert and oriented to person, place, and time.   Skin: Skin is warm and dry.       Procedures          Assessment:   Assessment/Plan      Karen was seen today for follow-up.    Diagnoses and all orders for this visit:    Coronary artery disease involving coronary bypass graft of native heart without angina pectoris.  Stable.  No angina.  On aspirin and Plavix.    Essential hypertension, controlled on beta-blocker.    Dyslipidemia, on Praluent.  Labs with primary care.      Plan:  1. Decrease aspirin to 81 mg daily secondary to easy bruising.  If persistent significant may need to consider discontinuing Plavix.  2. Continue other current medications.  3. Follow-up in 1 year's time or sooner if needed.       VAUGHN Metcalf   Dictated utilizing Dragon dictation

## 2019-09-11 ENCOUNTER — OFFICE VISIT (OUTPATIENT)
Dept: CARDIOLOGY | Facility: CLINIC | Age: 79
End: 2019-09-11

## 2019-09-11 VITALS
DIASTOLIC BLOOD PRESSURE: 80 MMHG | HEIGHT: 63 IN | BODY MASS INDEX: 32.96 KG/M2 | SYSTOLIC BLOOD PRESSURE: 130 MMHG | HEART RATE: 63 BPM | WEIGHT: 186 LBS | OXYGEN SATURATION: 98 %

## 2019-09-11 DIAGNOSIS — I10 ESSENTIAL HYPERTENSION: ICD-10-CM

## 2019-09-11 DIAGNOSIS — E78.5 DYSLIPIDEMIA: ICD-10-CM

## 2019-09-11 DIAGNOSIS — I25.810 CORONARY ARTERY DISEASE INVOLVING CORONARY BYPASS GRAFT OF NATIVE HEART WITHOUT ANGINA PECTORIS: Primary | ICD-10-CM

## 2019-09-11 PROCEDURE — 99214 OFFICE O/P EST MOD 30 MIN: CPT | Performed by: NURSE PRACTITIONER

## 2019-10-23 RX ORDER — CLOPIDOGREL BISULFATE 75 MG/1
75 TABLET ORAL DAILY
Qty: 90 TABLET | Refills: 0 | Status: SHIPPED | OUTPATIENT
Start: 2019-10-23 | End: 2020-03-06

## 2019-10-23 RX ORDER — ASPIRIN 81 MG/1
81 TABLET ORAL DAILY
Qty: 90 TABLET | Refills: 3 | Status: SHIPPED | OUTPATIENT
Start: 2019-10-23 | End: 2021-09-29

## 2020-02-06 ENCOUNTER — TELEPHONE (OUTPATIENT)
Dept: CARDIOLOGY | Facility: CLINIC | Age: 80
End: 2020-02-06

## 2020-02-06 NOTE — TELEPHONE ENCOUNTER
Spoke with [facility] pharmacy regarding coverage of Praluent,  They do not cover, prefer Repatha.  Spoke with Cuca at [facility], they will attempt appeal for patient and let me know what, if anything we need to do.

## 2020-02-20 ENCOUNTER — TELEPHONE (OUTPATIENT)
Dept: CARDIOLOGY | Facility: CLINIC | Age: 80
End: 2020-02-20

## 2020-02-20 NOTE — TELEPHONE ENCOUNTER
Called to get most recent lipid panel from PCP for [facility] pharmacy to attempt a formulary exception for Praluent due to patients past reaction to Repatha.

## 2020-03-06 RX ORDER — CLOPIDOGREL BISULFATE 75 MG/1
75 TABLET ORAL DAILY
Qty: 90 TABLET | Refills: 1 | Status: SHIPPED | OUTPATIENT
Start: 2020-03-06 | End: 2022-09-28

## 2020-04-15 ENCOUNTER — PRIOR AUTHORIZATION (OUTPATIENT)
Dept: CARDIOLOGY | Facility: CLINIC | Age: 80
End: 2020-04-15

## 2020-04-15 NOTE — TELEPHONE ENCOUNTER
YESIKA MCFARLANE Key: [id number]  - PA Case ID: [id number]   Need help?Call us at [phone]             Outcome    ?Approved today    PA Case: [id number], Status: Approved,     Coverage Starts on: 1/1/2020 12:00:00 AM, Coverage Ends on: 12/31/2020 12:00:00 AM. Questions? Contact [phone].          Drug    Praluent 75MG/ML auto-injectors      Form    [employer] Electronic PA Form            [facility] notified of approval.

## 2020-05-12 RX ORDER — ALIROCUMAB 75 MG/ML
INJECTION, SOLUTION SUBCUTANEOUS
Qty: 2 ML | Refills: 11 | Status: SHIPPED | OUTPATIENT
Start: 2020-05-12 | End: 2021-03-01

## 2020-09-16 ENCOUNTER — OFFICE VISIT (OUTPATIENT)
Dept: CARDIOLOGY | Facility: CLINIC | Age: 80
End: 2020-09-16

## 2020-09-16 VITALS
SYSTOLIC BLOOD PRESSURE: 148 MMHG | HEIGHT: 62 IN | WEIGHT: 187 LBS | DIASTOLIC BLOOD PRESSURE: 86 MMHG | OXYGEN SATURATION: 93 % | BODY MASS INDEX: 34.41 KG/M2 | HEART RATE: 61 BPM

## 2020-09-16 DIAGNOSIS — I25.810 CORONARY ARTERY DISEASE INVOLVING CORONARY BYPASS GRAFT OF NATIVE HEART WITHOUT ANGINA PECTORIS: Primary | ICD-10-CM

## 2020-09-16 DIAGNOSIS — E78.00 PURE HYPERCHOLESTEROLEMIA: ICD-10-CM

## 2020-09-16 DIAGNOSIS — I10 ESSENTIAL HYPERTENSION: ICD-10-CM

## 2020-09-16 PROCEDURE — 99213 OFFICE O/P EST LOW 20 MIN: CPT | Performed by: INTERNAL MEDICINE

## 2020-09-16 RX ORDER — EMPAGLIFLOZIN 10 MG/1
1 TABLET, FILM COATED ORAL DAILY
COMMUNITY

## 2020-09-16 NOTE — PROGRESS NOTES
"Subjective:     Encounter Date:2020      Patient ID: Karen Cheema is a 80 y.o. female.    Chief Complaint: Coronary artery disease involving coronary bypass graft of n      PROBLEM LIST:  1. Coronary artery disease  a. Dyspnea, abnormal stress Cardiolite   b. Cardiac catheter left main and multivessel disease normal LVEF  c. CABG ×5, Fabian, 16 BERRY to LAD and SVG to diagonal, SVG to OM1, and SVG to PDA and RPL  d.  MPS negative for ischemia  2. Dyslipidemia  3. Diabetes, type II  4. Asthma  5. Hypothyroidism  6. Rosacea  7. Multiple drug intolerances  8. Surgical history  a. Cholecystectomy  b. Bladder surgery  c. CABG  d.  section  e. Hysterectomy    History of Present Illness  Patient returns today for follow up with a history of artery disease, for routine follow-up.  She is now 4 years status post CABG.  She is overall doing well.  She has no cardiovascular complaints.  Denies chest pain shortness of breath orthopnea PND tachypalpitations or claudication.  She knows she is recently try to do weight watchers diet and exercise it with this noticed her blood sugar became quite labile, including some hypoglycemic episodes.  She is scheduled see her primary care physician to further discuss this.  3 episodes of palpitations over the last 6 months where she awakes from sleep with her \"heart being irregular\" that lasts less than a minute before spontaneously correcting..     Allergies   Allergen Reactions   • Fish-Derived Products      \"turned purple and lost my hair\"   • Avandia [Rosiglitazone] Swelling   • Ciprofloxacin Swelling   • Crestor [Rosuvastatin] Myalgia     Muscle stiffness   • Eggs Or Egg-Derived Products Nausea Only   • Glyburide Swelling   • Iodine      After eating fish, \"turned purple\", lost hair--was told that she would be allergic to contrast dye as well   • Levaquin [Levofloxacin] Nausea And Vomiting and Swelling   • Lipitor [Atorvastatin] Myalgia     Muscle stiffness "   • Pravastatin Myalgia   • Sulfa Antibiotics Nausea Only         Current Outpatient Medications:   •  aspirin EC 81 MG EC tablet, Take 1 tablet by mouth Daily., Disp: 90 tablet, Rfl: 3  •  Chlorcyclizine-Pseudoephed (STAHIST AD PO), Take 1 tablet by mouth Daily., Disp: , Rfl:   •  Cholecalciferol (VITAMIN D3) 5000 UNITS tablet, Take 5,000 Units by mouth Daily., Disp: , Rfl:   •  clopidogrel (PLAVIX) 75 MG tablet, TAKE 1 TABLET BY MOUTH DAILY, Disp: 90 tablet, Rfl: 1  •  coenzyme Q10 100 MG capsule, Take 200 mg by mouth Daily., Disp: , Rfl:   •  Empagliflozin (Jardiance) 10 MG tablet, Take 1 tablet by mouth Daily., Disp: , Rfl:   •  levothyroxine (SYNTHROID, LEVOTHROID) 137 MCG tablet, Take 137 mcg by mouth Daily., Disp: , Rfl:   •  loratadine (CLARITIN) 10 MG tablet, Take 10 mg by mouth Daily., Disp: , Rfl:   •  metFORMIN (GLUCOPHAGE) 1000 MG tablet, Take 1,000 mg by mouth 2 (two) times a day with meals., Disp: , Rfl:   •  metoprolol tartrate (LOPRESSOR) 25 MG tablet, TAKE ONE-HALF TABLET BY MOUTH EVERY 12 HOURS, Disp: 90 tablet, Rfl: 1  •  PRALUENT 75 MG/ML solution auto-injector, INJECT 75MG(1ML) UNDER THE SKIN EVERY 2 WEEKS AS DIRECTED, Disp: 2 mL, Rfl: 11  •  Alirocumab 75 MG/ML solution auto-injector, Inject 75 mg under the skin into the appropriate area as directed Every 14 (Fourteen) Days., Disp: 6 pen, Rfl: 3  •  aspirin 81 MG tablet, Take 1 tablet by mouth Daily., Disp: 30 tablet, Rfl: 11    The following portions of the patient's history were reviewed and updated as appropriate: allergies, current medications, past family history, past medical history, past social history, past surgical history and problem list.    Review of Systems   Constitution: Negative.   Cardiovascular: Positive for irregular heartbeat. Negative for chest pain, dyspnea on exertion, leg swelling, palpitations and syncope.   Respiratory: Negative.  Negative for shortness of breath.    Hematologic/Lymphatic: Negative for bleeding  "problem. Does not bruise/bleed easily.   Skin: Negative for rash.   Musculoskeletal: Negative for muscle weakness and myalgias.   Gastrointestinal: Negative for heartburn, nausea and vomiting.   Neurological: Negative for dizziness, light-headedness, loss of balance and numbness.          Objective:   Blood pressure 148/86, pulse 61, height 157.5 cm (62\"), weight 84.8 kg (187 lb), SpO2 93 %.      Vitals signs reviewed.   Constitutional:       Appearance: Well-developed and not in distress.   Neck:      Thyroid: No thyromegaly.      Vascular: No carotid bruit or JVD.   Pulmonary:      Breath sounds: Normal breath sounds.   Cardiovascular:      Regular rhythm.      No gallop. No S3 and S4 gallop.   Edema:     Peripheral edema absent.   Abdominal:      General: Bowel sounds are normal.      Palpations: Abdomen is soft. There is no abdominal mass.      Tenderness: There is no abdominal tenderness.   Musculoskeletal:         General: No deformity.      Extremities: No clubbing present.  Skin:     General: Skin is warm and dry.      Findings: No rash.   Neurological:      Mental Status: Alert and oriented to person, place, and time.         Lab Review:    Procedures        Assessment:   Karen was seen today for coronary artery disease involving coronary bypass graft of n.    Diagnoses and all orders for this visit:    Coronary artery disease involving coronary bypass graft of native heart without angina pectoris    Pure hypercholesterolemia    Essential hypertension        Impression  1. Coronary artery disease, 4 years status post multivessel PCI.  Currently no angina  2. Hypertension well-controlled  3. Dyslipidemia on Praluent (intolerant to statins).  Last LDL at goal.  4. Palpitations benign consistent with PACs/PVCs.  5. Diabetes, labile/low blood sugars with exercise and weight loss.      Recommendations   1 continue current medical therapy  2.  Encourage to see primary physician to consider decreasing her diabetic " medicine burden if she is going to follow a better lifestyle.  6. Revisit annually or PRN symptom change      Anthony Marmolejo MD

## 2021-01-11 ENCOUNTER — TRANSCRIBE ORDERS (OUTPATIENT)
Dept: ADMINISTRATIVE | Facility: HOSPITAL | Age: 81
End: 2021-01-11

## 2021-01-11 ENCOUNTER — HOSPITAL ENCOUNTER (OUTPATIENT)
Dept: GENERAL RADIOLOGY | Facility: HOSPITAL | Age: 81
Discharge: HOME | End: 2021-01-11
Admitting: FAMILY MEDICINE

## 2021-01-11 DIAGNOSIS — R53.83 FATIGUE, UNSPECIFIED TYPE: Primary | ICD-10-CM

## 2021-01-11 DIAGNOSIS — U07.1 LAB TEST POSITIVE FOR DETECTION OF COVID-19 VIRUS: ICD-10-CM

## 2021-01-11 PROCEDURE — 71046 X-RAY EXAM CHEST 2 VIEWS: CPT

## 2021-02-03 ENCOUNTER — TELEPHONE (OUTPATIENT)
Dept: CARDIOLOGY | Facility: CLINIC | Age: 81
End: 2021-02-03

## 2021-02-03 NOTE — TELEPHONE ENCOUNTER
Insurance change this year requires Repatha as formulary, patient is concerned with switching due to recent covid infection and problems with blood glucose levels.  PA to be started to see if can keep patient on Praluent by [facility]

## 2021-02-04 ENCOUNTER — PRIOR AUTHORIZATION (OUTPATIENT)
Dept: CARDIOLOGY | Facility: CLINIC | Age: 81
End: 2021-02-04

## 2021-02-04 NOTE — TELEPHONE ENCOUNTER
YESIKA MCFARLANE Key: [id number] - PA Case ID: [id number] help? Call us at [phone]  Status  Sent to [employer]  Drug  Praluent 75MG/ML auto-injectors  Form  [employer] Electronic PA Form

## 2021-03-01 RX ORDER — ALIROCUMAB 75 MG/ML
INJECTION, SOLUTION SUBCUTANEOUS
Qty: 6 ML | Refills: 3 | Status: SHIPPED | OUTPATIENT
Start: 2021-03-01 | End: 2021-03-01

## 2021-03-01 RX ORDER — ALIROCUMAB 75 MG/ML
INJECTION, SOLUTION SUBCUTANEOUS
Qty: 6 ML | Refills: 3 | Status: SHIPPED | OUTPATIENT
Start: 2021-03-01 | End: 2022-01-11 | Stop reason: SDUPTHER

## 2021-07-15 ENCOUNTER — TELEPHONE (OUTPATIENT)
Dept: CARDIOLOGY | Facility: CLINIC | Age: 81
End: 2021-07-15

## 2021-07-15 NOTE — TELEPHONE ENCOUNTER
Spoke with patient, she would like to be seen sooner due to having pain in shoulder and neck, has seen PCP and chiropractor without relief. Denies chest pain.  Advised will ask scheduling to put on next available but advised active chest pain should be evaluated at ER.

## 2021-09-29 ENCOUNTER — OFFICE VISIT (OUTPATIENT)
Dept: CARDIOLOGY | Facility: CLINIC | Age: 81
End: 2021-09-29

## 2021-09-29 VITALS
SYSTOLIC BLOOD PRESSURE: 138 MMHG | OXYGEN SATURATION: 97 % | HEIGHT: 63 IN | HEART RATE: 71 BPM | BODY MASS INDEX: 32.6 KG/M2 | WEIGHT: 184 LBS | DIASTOLIC BLOOD PRESSURE: 80 MMHG

## 2021-09-29 DIAGNOSIS — E78.00 PURE HYPERCHOLESTEROLEMIA: ICD-10-CM

## 2021-09-29 DIAGNOSIS — I25.810 CORONARY ARTERY DISEASE INVOLVING CORONARY BYPASS GRAFT OF NATIVE HEART WITHOUT ANGINA PECTORIS: Primary | ICD-10-CM

## 2021-09-29 DIAGNOSIS — I10 ESSENTIAL HYPERTENSION: ICD-10-CM

## 2021-09-29 PROCEDURE — 99213 OFFICE O/P EST LOW 20 MIN: CPT | Performed by: INTERNAL MEDICINE

## 2022-01-11 RX ORDER — ALIROCUMAB 75 MG/ML
75 INJECTION, SOLUTION SUBCUTANEOUS
Qty: 6 ML | Refills: 3 | Status: SHIPPED | OUTPATIENT
Start: 2022-01-11 | End: 2023-02-13 | Stop reason: SDUPTHER

## 2022-03-22 ENCOUNTER — TRANSCRIBE ORDERS (OUTPATIENT)
Dept: LAB | Facility: HOSPITAL | Age: 82
End: 2022-03-22

## 2022-03-22 ENCOUNTER — LAB (OUTPATIENT)
Dept: LAB | Facility: HOSPITAL | Age: 82
End: 2022-03-22

## 2022-03-22 DIAGNOSIS — N28.9 URETERAL SLUDGE: Primary | ICD-10-CM

## 2022-03-22 DIAGNOSIS — N28.9 URETERAL SLUDGE: ICD-10-CM

## 2022-03-22 LAB
BACTERIA UR QL AUTO: NORMAL /HPF
BASOPHILS # BLD AUTO: 0.03 10*3/MM3 (ref 0–0.2)
BASOPHILS NFR BLD AUTO: 0.5 % (ref 0–1.5)
BILIRUB UR QL STRIP: NEGATIVE
CLARITY UR: CLEAR
COLOR UR: YELLOW
CREAT UR-MCNC: 32.4 MG/DL
DEPRECATED RDW RBC AUTO: 45.6 FL (ref 37–54)
EOSINOPHIL # BLD AUTO: 0.13 10*3/MM3 (ref 0–0.4)
EOSINOPHIL NFR BLD AUTO: 2.2 % (ref 0.3–6.2)
ERYTHROCYTE [DISTWIDTH] IN BLOOD BY AUTOMATED COUNT: 13.5 % (ref 12.3–15.4)
GLUCOSE UR STRIP-MCNC: ABNORMAL MG/DL
HCT VFR BLD AUTO: 43.1 % (ref 34–46.6)
HGB BLD-MCNC: 14.5 G/DL (ref 12–15.9)
HGB UR QL STRIP.AUTO: NEGATIVE
HYALINE CASTS UR QL AUTO: NORMAL /LPF
IMM GRANULOCYTES # BLD AUTO: 0.01 10*3/MM3 (ref 0–0.05)
IMM GRANULOCYTES NFR BLD AUTO: 0.2 % (ref 0–0.5)
KETONES UR QL STRIP: NEGATIVE
LEUKOCYTE ESTERASE UR QL STRIP.AUTO: NEGATIVE
LYMPHOCYTES # BLD AUTO: 2.06 10*3/MM3 (ref 0.7–3.1)
LYMPHOCYTES NFR BLD AUTO: 35.4 % (ref 19.6–45.3)
MCH RBC QN AUTO: 31.3 PG (ref 26.6–33)
MCHC RBC AUTO-ENTMCNC: 33.6 G/DL (ref 31.5–35.7)
MCV RBC AUTO: 92.9 FL (ref 79–97)
MONOCYTES # BLD AUTO: 0.58 10*3/MM3 (ref 0.1–0.9)
MONOCYTES NFR BLD AUTO: 10 % (ref 5–12)
NEUTROPHILS NFR BLD AUTO: 3.01 10*3/MM3 (ref 1.7–7)
NEUTROPHILS NFR BLD AUTO: 51.7 % (ref 42.7–76)
NITRITE UR QL STRIP: NEGATIVE
NRBC BLD AUTO-RTO: 0 /100 WBC (ref 0–0.2)
PH UR STRIP.AUTO: 6 [PH] (ref 5–8)
PLATELET # BLD AUTO: 257 10*3/MM3 (ref 140–450)
PMV BLD AUTO: 9.5 FL (ref 6–12)
PROT UR QL STRIP: NEGATIVE
RBC # BLD AUTO: 4.64 10*6/MM3 (ref 3.77–5.28)
RBC # UR STRIP: NORMAL /HPF
REF LAB TEST METHOD: NORMAL
SP GR UR STRIP: 1.01 (ref 1–1.03)
SQUAMOUS #/AREA URNS HPF: NORMAL /HPF
UROBILINOGEN UR QL STRIP: ABNORMAL
WBC # UR STRIP: NORMAL /HPF
WBC NRBC COR # BLD: 5.82 10*3/MM3 (ref 3.4–10.8)

## 2022-03-22 PROCEDURE — 85025 COMPLETE CBC W/AUTO DIFF WBC: CPT

## 2022-03-22 PROCEDURE — 36415 COLL VENOUS BLD VENIPUNCTURE: CPT | Performed by: NURSE PRACTITIONER

## 2022-03-22 PROCEDURE — 84156 ASSAY OF PROTEIN URINE: CPT | Performed by: NURSE PRACTITIONER

## 2022-03-22 PROCEDURE — 80069 RENAL FUNCTION PANEL: CPT | Performed by: NURSE PRACTITIONER

## 2022-03-22 PROCEDURE — 82570 ASSAY OF URINE CREATININE: CPT | Performed by: NURSE PRACTITIONER

## 2022-03-22 PROCEDURE — 81001 URINALYSIS AUTO W/SCOPE: CPT | Performed by: NURSE PRACTITIONER

## 2022-03-23 LAB
ALBUMIN SERPL-MCNC: 4.6 G/DL (ref 3.5–5.2)
ANION GAP SERPL CALCULATED.3IONS-SCNC: 15.2 MMOL/L (ref 5–15)
BUN SERPL-MCNC: 17 MG/DL (ref 8–23)
BUN/CREAT SERPL: 14.5 (ref 7–25)
CALCIUM SPEC-SCNC: 10.4 MG/DL (ref 8.6–10.5)
CHLORIDE SERPL-SCNC: 101 MMOL/L (ref 98–107)
CO2 SERPL-SCNC: 24.8 MMOL/L (ref 22–29)
CREAT SERPL-MCNC: 1.17 MG/DL (ref 0.57–1)
EGFRCR SERPLBLD CKD-EPI 2021: 47.3 ML/MIN/1.73
GLUCOSE SERPL-MCNC: 102 MG/DL (ref 65–99)
PHOSPHATE SERPL-MCNC: 4.1 MG/DL (ref 2.5–4.5)
POTASSIUM SERPL-SCNC: 4.3 MMOL/L (ref 3.5–5.2)
PROT ?TM UR-MCNC: <4 MG/DL
SODIUM SERPL-SCNC: 141 MMOL/L (ref 136–145)

## 2022-09-28 ENCOUNTER — OFFICE VISIT (OUTPATIENT)
Dept: CARDIOLOGY | Facility: CLINIC | Age: 82
End: 2022-09-28

## 2022-09-28 VITALS
BODY MASS INDEX: 32.6 KG/M2 | DIASTOLIC BLOOD PRESSURE: 80 MMHG | SYSTOLIC BLOOD PRESSURE: 134 MMHG | HEIGHT: 63 IN | WEIGHT: 184 LBS | OXYGEN SATURATION: 95 % | HEART RATE: 71 BPM

## 2022-09-28 DIAGNOSIS — I10 PRIMARY HYPERTENSION: ICD-10-CM

## 2022-09-28 DIAGNOSIS — I25.810 CORONARY ARTERY DISEASE INVOLVING CORONARY BYPASS GRAFT OF NATIVE HEART, UNSPECIFIED WHETHER ANGINA PRESENT: Primary | ICD-10-CM

## 2022-09-28 DIAGNOSIS — E78.00 PURE HYPERCHOLESTEROLEMIA: ICD-10-CM

## 2022-09-28 DIAGNOSIS — Z95.1 S/P CABG X 5: ICD-10-CM

## 2022-09-28 DIAGNOSIS — I25.810 CORONARY ARTERY DISEASE INVOLVING CORONARY BYPASS GRAFT OF NATIVE HEART WITHOUT ANGINA PECTORIS: Primary | ICD-10-CM

## 2022-09-28 PROCEDURE — 99214 OFFICE O/P EST MOD 30 MIN: CPT | Performed by: INTERNAL MEDICINE

## 2022-09-28 NOTE — PROGRESS NOTES
"Subjective:     Encounter Date:2022    Primary Care Physician: Darcy Parry APRN      Patient ID: Karen Cheema is a 82 y.o. female.    Chief Complaint:Follow-up      PROBLEM LIST:  1. Coronary artery disease  1. Dyspnea, abnormal stress Cardiolite   2. Cardiac catheter left main and multivessel disease normal LVEF  3. CABG ×5, Fabian, 16 BERRY to LAD and SVG to diagonal, SVG to OM1, and SVG to PDA and RPL  4.  MPS negative for ischemia  2. Dyslipidemia  3. Diabetes, type II  4. Asthma  5. Hypothyroidism  6. Rosacea  7. Multiple drug intolerances  8. Cervical spine arthritis  9. Surgical history  1. Cholecystectomy  2. Bladder surgery  3. CABG  4.  section  5. Hysterectomy      Allergies   Allergen Reactions   • Fish-Derived Products      \"turned purple and lost my hair\"   • Avandia [Rosiglitazone] Swelling   • Ciprofloxacin Swelling   • Crestor [Rosuvastatin] Myalgia     Muscle stiffness   • Eggs Or Egg-Derived Products Nausea Only   • Glyburide Swelling   • Iodine      After eating fish, \"turned purple\", lost hair--was told that she would be allergic to contrast dye as well   • Levaquin [Levofloxacin] Nausea And Vomiting and Swelling   • Lipitor [Atorvastatin] Myalgia     Muscle stiffness   • Pravastatin Myalgia   • Sulfa Antibiotics Nausea Only         Current Outpatient Medications:   •  Alirocumab (Praluent) 75 MG/ML solution auto-injector, Inject 1 mL under the skin into the appropriate area as directed Every 14 (Fourteen) Days., Disp: 6 mL, Rfl: 3  •  aspirin 81 MG tablet, Take 1 tablet by mouth Daily., Disp: 30 tablet, Rfl: 11  •  Chlorcyclizine-Pseudoephed (STAHIST AD PO), Take 1 tablet by mouth Daily., Disp: , Rfl:   •  clopidogrel (PLAVIX) 75 MG tablet, TAKE 1 TABLET BY MOUTH DAILY, Disp: 90 tablet, Rfl: 1  •  coenzyme Q10 100 MG capsule, Take 200 mg by mouth Daily., Disp: , Rfl:   •  Empagliflozin (Jardiance) 10 MG tablet, Take 1 tablet by mouth Daily., Disp: , Rfl:   • " " levothyroxine (SYNTHROID, LEVOTHROID) 137 MCG tablet, Take 137 mcg by mouth Daily., Disp: , Rfl:   •  loratadine (CLARITIN) 10 MG tablet, Take 10 mg by mouth Daily., Disp: , Rfl:   •  metFORMIN (GLUCOPHAGE) 1000 MG tablet, Take 1,000 mg by mouth 2 (two) times a day with meals., Disp: , Rfl:   •  metoprolol tartrate (LOPRESSOR) 25 MG tablet, TAKE ONE-HALF TABLET BY MOUTH EVERY 12 HOURS (Patient taking differently: Take 25 mg by mouth Daily.), Disp: 90 tablet, Rfl: 1        History of Present Illness    Patient returns for annual follow-up of coronary artery disease and risk factors.  Since her last visit, she has no cardiovascular plaints.  Denies chest pain dyspnea orthopnea PND presyncope or syncope.  She has had multiple cervical spine injections for her significant cervical arthritis which is improved her neck pain, however each of which is led to significant hyperglycemia and subsequent weight gain.  Also complains of easy bruising.    The following portions of the patient's history were reviewed and updated as appropriate: allergies, current medications, past family history, past medical history, past social history, past surgical history and problem list.      Social History     Tobacco Use   • Smoking status: Never Smoker   • Smokeless tobacco: Never Used   Substance Use Topics   • Alcohol use: No   • Drug use: No         ROS       Objective:   /80   Pulse 71   Ht 160 cm (63\")   Wt 83.5 kg (184 lb)   SpO2 95%   BMI 32.59 kg/m²         Vitals reviewed.   Constitutional:       Appearance: Well-developed and not in distress.   Neck:      Thyroid: No thyromegaly.      Vascular: No carotid bruit or JVD.   Pulmonary:      Breath sounds: Normal breath sounds.   Cardiovascular:      Regular rhythm.      Murmurs: There is a grade 3/6 systolic murmur at the URSB, LRSB and ULSB.      No gallop. No S3 and S4 gallop.   Abdominal:      General: Bowel sounds are normal.      Palpations: Abdomen is soft. There is " no abdominal mass.      Tenderness: There is no abdominal tenderness.   Musculoskeletal:         General: No deformity.      Extremities: No clubbing present.Skin:     General: Skin is warm and dry.      Findings: No rash.   Neurological:      Mental Status: Alert and oriented to person, place, and time.         Procedures          Assessment:   Assessment & Plan      Diagnoses and all orders for this visit:    1. Coronary artery disease involving coronary bypass graft of native heart without angina pectoris (Primary)    2. Primary hypertension    3. Pure hypercholesterolemia      1.  Coronary artery disease,.  6 years post CABG.  No angina.  2.  New murmur.  Assistant with aortic stenosis.  3.  Dyslipidemia on Praluent with LDL less than 70  4.  Hypertension well-controlled    Recommendations:  1.  Patient may discontinue Plavix given her significant ecchymosis/bruising.  2.  Check echocardiogram prior to next years visit.  3.  Continue to encourage weight loss exercise.  4.  Revisit annually apparent symptom change    Anthony Marmolejo MD             Dictated utilizing Dragon dictation

## 2023-02-13 RX ORDER — ALIROCUMAB 75 MG/ML
75 INJECTION, SOLUTION SUBCUTANEOUS
Qty: 6 ML | Refills: 3 | Status: SHIPPED | OUTPATIENT
Start: 2023-02-13

## 2023-05-02 DIAGNOSIS — R00.2 PALPITATIONS: Primary | ICD-10-CM

## 2023-11-01 ENCOUNTER — OFFICE VISIT (OUTPATIENT)
Dept: CARDIOLOGY | Facility: CLINIC | Age: 83
End: 2023-11-01
Payer: MEDICARE

## 2023-11-01 VITALS
BODY MASS INDEX: 35.93 KG/M2 | HEART RATE: 73 BPM | OXYGEN SATURATION: 97 % | DIASTOLIC BLOOD PRESSURE: 74 MMHG | WEIGHT: 183 LBS | SYSTOLIC BLOOD PRESSURE: 128 MMHG | HEIGHT: 60 IN

## 2023-11-01 DIAGNOSIS — I10 PRIMARY HYPERTENSION: ICD-10-CM

## 2023-11-01 DIAGNOSIS — I25.810 CORONARY ARTERY DISEASE INVOLVING CORONARY BYPASS GRAFT OF NATIVE HEART WITHOUT ANGINA PECTORIS: Primary | ICD-10-CM

## 2023-11-01 DIAGNOSIS — E78.00 PURE HYPERCHOLESTEROLEMIA: ICD-10-CM

## 2023-11-01 PROCEDURE — 1159F MED LIST DOCD IN RCRD: CPT | Performed by: NURSE PRACTITIONER

## 2023-11-01 PROCEDURE — 3078F DIAST BP <80 MM HG: CPT | Performed by: NURSE PRACTITIONER

## 2023-11-01 PROCEDURE — 99214 OFFICE O/P EST MOD 30 MIN: CPT | Performed by: NURSE PRACTITIONER

## 2023-11-01 PROCEDURE — 3074F SYST BP LT 130 MM HG: CPT | Performed by: NURSE PRACTITIONER

## 2023-11-01 PROCEDURE — 1160F RVW MEDS BY RX/DR IN RCRD: CPT | Performed by: NURSE PRACTITIONER

## 2023-11-01 NOTE — LETTER
"2023       No Recipients    Patient: Karen Cheema   YOB: 1940   Date of Visit: 2023     Dear VAUGHN Layton:       Thank you for referring Karen Cheema to me for evaluation. Below are the relevant portions of my assessment and plan of care.    If you have questions, please do not hesitate to call me. I look forward to following Karen along with you.         Sincerely,        VAUGHN Metcalf        CC:   No Recipients    Shi Barboza APRN  [id number]  Sign when Signing Visit  Subjective:     Encounter Date:2023    Primary Care Physician: Darcy Parry APRN      Patient ID: Karen Cheema is a 83 y.o. female.    Chief Complaint:Coronary Artery Disease      PROBLEM LIST:  Coronary artery disease  Dyspnea, abnormal stress Cardiolite   Cardiac catheter left main and multivessel disease normal LVEF  CABG ×5, Fabian, 16 BERRY to LAD and SVG to diagonal, SVG to OM1, and SVG to PDA and RPL   MPS negative for ischemia  2023 echo EF 60%.  Normal valves.  Dyslipidemia  Diabetes, type II  Asthma  Hypothyroidism  Rosacea  Multiple drug intolerances  Cervical spine arthritis  Surgical history  Cholecystectomy  Bladder surgery  CABG   section  Hysterectomy        Allergies   Allergen Reactions   • Fish-Derived Products      \"turned purple and lost my hair\"   • Avandia [Rosiglitazone] Swelling   • Ciprofloxacin Swelling   • Crestor [Rosuvastatin] Myalgia     Muscle stiffness   • Eggs Or Egg-Derived Products Nausea Only   • Glyburide Swelling   • Iodine      After eating fish, \"turned purple\", lost hair--was told that she would be allergic to contrast dye as well   • Levaquin [Levofloxacin] Nausea And Vomiting and Swelling   • Lipitor [Atorvastatin] Myalgia     Muscle stiffness   • Pravastatin Myalgia   • Sulfa Antibiotics Nausea Only         Current Outpatient Medications:   •  Alirocumab (Praluent) 75 MG/ML solution auto-injector, Inject 1 mL " under the skin into the appropriate area as directed Every 14 (Fourteen) Days., Disp: 6 mL, Rfl: 3  •  aspirin 81 MG tablet, Take 1 tablet by mouth Daily., Disp: 30 tablet, Rfl: 11  •  Chlorcyclizine-Pseudoephed (STAHIST AD PO), Take 1 tablet by mouth Daily., Disp: , Rfl:   •  coenzyme Q10 100 MG capsule, Take 2 capsules by mouth Daily., Disp: , Rfl:   •  Empagliflozin (Jardiance) 10 MG tablet, Take 1 tablet by mouth Daily., Disp: , Rfl:   •  levothyroxine (SYNTHROID, LEVOTHROID) 137 MCG tablet, Take 1 tablet by mouth Daily., Disp: , Rfl:   •  loratadine (CLARITIN) 10 MG tablet, Take 1 tablet by mouth Daily., Disp: , Rfl:   •  metoprolol tartrate (LOPRESSOR) 25 MG tablet, TAKE ONE-HALF TABLET BY MOUTH EVERY 12 HOURS (Patient taking differently: Take 1 tablet by mouth Daily.), Disp: 90 tablet, Rfl: 1  •  Semaglutide (OZEMPIC, 1 MG/DOSE, SC), Inject 1 mg under the skin into the appropriate area as directed 1 (One) Time Per Week., Disp: , Rfl:   •  metFORMIN (GLUCOPHAGE) 1000 MG tablet, Take 1,000 mg by mouth 2 (two) times a day with meals. (Patient not taking: Reported on 11/1/2023), Disp: , Rfl:         History of Present Illness    Patient is an 83-year-old  female who is being seen today for annual follow-up of coronary artery disease.  Since last being seen she notes overall doing well.  She did have some palpitations since last being seen and underwent monitor and echocardiogram with results noting PVCs and no significant valve disease.  Patient notes since that time her palpitations have resolved.  She denies any chest pain, pressure, tightness.  Denies any increasing shortness of breath.  She tries to walk anywhere from 7000-10,000 steps per day around her home.  No reported syncope, presyncope.    The following portions of the patient's history were reviewed and updated as appropriate: allergies, current medications, past family history, past medical history, past social history, past surgical history  "and problem list.      Social History     Tobacco Use   • Smoking status: Never   • Smokeless tobacco: Never   Substance Use Topics   • Alcohol use: No   • Drug use: No         ROS       Objective:   /74   Pulse 73   Ht 152.4 cm (60\")   Wt 83 kg (183 lb)   SpO2 97%   BMI 35.74 kg/m²         Vitals reviewed.   Constitutional:       Appearance: Well-developed and not in distress.   Neck:      Vascular: No JVD.      Trachea: No tracheal deviation.   Pulmonary:      Effort: Pulmonary effort is normal.      Breath sounds: Normal breath sounds.   Cardiovascular:      Normal rate. Regular rhythm.      Murmurs: There is a grade 2/6 systolic murmur at the URSB.   Pulses:     Intact distal pulses.   Edema:     Peripheral edema absent.   Musculoskeletal:         General: No deformity. Skin:     General: Skin is warm and dry.   Neurological:      Mental Status: Alert and oriented to person, place, and time.         Procedures          Assessment:   Assessment & Plan     Diagnoses and all orders for this visit:    1. Coronary artery disease involving coronary bypass graft of native heart without angina pectoris (Primary), stable.  On aspirin.    2. Primary hypertension, controlled.  On beta-blocker.    3. Pure hypercholesterolemia, stable.  Labs with primary care.  Statin intolerant.      Plan:  Patient is overall stable from cardiac standpoint.  Continue current cardiac medications.  Follow-up in 1 years time or sooner if needed.       VAUGHN Metcalf       Advance Care Planning  ACP discussion was held with the patient during this visit. Patient does not have an advance directive, declines further assistance.        Dictated utilizing Dragon dictation  "

## 2023-11-01 NOTE — PROGRESS NOTES
"Subjective:     Encounter Date:2023    Primary Care Physician: Darcy Parry APRN      Patient ID: Karen Cheema is a 83 y.o. female.    Chief Complaint:Coronary Artery Disease      PROBLEM LIST:  Coronary artery disease  Dyspnea, abnormal stress Cardiolite   Cardiac catheter left main and multivessel disease normal LVEF  CABG ×5, Fabian, 16 BERRY to LAD and SVG to diagonal, SVG to OM1, and SVG to PDA and RPL   MPS negative for ischemia  2023 echo EF 60%.  Normal valves.  Dyslipidemia  Diabetes, type II  Asthma  Hypothyroidism  Rosacea  Multiple drug intolerances  Cervical spine arthritis  Surgical history  Cholecystectomy  Bladder surgery  CABG   section  Hysterectomy        Allergies   Allergen Reactions    Fish-Derived Products      \"turned purple and lost my hair\"    Avandia [Rosiglitazone] Swelling    Ciprofloxacin Swelling    Crestor [Rosuvastatin] Myalgia     Muscle stiffness    Eggs Or Egg-Derived Products Nausea Only    Glyburide Swelling    Iodine      After eating fish, \"turned purple\", lost hair--was told that she would be allergic to contrast dye as well    Levaquin [Levofloxacin] Nausea And Vomiting and Swelling    Lipitor [Atorvastatin] Myalgia     Muscle stiffness    Pravastatin Myalgia    Sulfa Antibiotics Nausea Only         Current Outpatient Medications:     Alirocumab (Praluent) 75 MG/ML solution auto-injector, Inject 1 mL under the skin into the appropriate area as directed Every 14 (Fourteen) Days., Disp: 6 mL, Rfl: 3    aspirin 81 MG tablet, Take 1 tablet by mouth Daily., Disp: 30 tablet, Rfl: 11    Chlorcyclizine-Pseudoephed (STAHIST AD PO), Take 1 tablet by mouth Daily., Disp: , Rfl:     coenzyme Q10 100 MG capsule, Take 2 capsules by mouth Daily., Disp: , Rfl:     Empagliflozin (Jardiance) 10 MG tablet, Take 1 tablet by mouth Daily., Disp: , Rfl:     levothyroxine (SYNTHROID, LEVOTHROID) 137 MCG tablet, Take 1 tablet by mouth Daily., Disp: , Rfl:     " "loratadine (CLARITIN) 10 MG tablet, Take 1 tablet by mouth Daily., Disp: , Rfl:     metoprolol tartrate (LOPRESSOR) 25 MG tablet, TAKE ONE-HALF TABLET BY MOUTH EVERY 12 HOURS (Patient taking differently: Take 1 tablet by mouth Daily.), Disp: 90 tablet, Rfl: 1    Semaglutide (OZEMPIC, 1 MG/DOSE, SC), Inject 1 mg under the skin into the appropriate area as directed 1 (One) Time Per Week., Disp: , Rfl:     metFORMIN (GLUCOPHAGE) 1000 MG tablet, Take 1,000 mg by mouth 2 (two) times a day with meals. (Patient not taking: Reported on 11/1/2023), Disp: , Rfl:         History of Present Illness    Patient is an 83-year-old  female who is being seen today for annual follow-up of coronary artery disease.  Since last being seen she notes overall doing well.  She did have some palpitations since last being seen and underwent monitor and echocardiogram with results noting PVCs and no significant valve disease.  Patient notes since that time her palpitations have resolved.  She denies any chest pain, pressure, tightness.  Denies any increasing shortness of breath.  She tries to walk anywhere from 7000-10,000 steps per day around her home.  No reported syncope, presyncope.    The following portions of the patient's history were reviewed and updated as appropriate: allergies, current medications, past family history, past medical history, past social history, past surgical history and problem list.      Social History     Tobacco Use    Smoking status: Never    Smokeless tobacco: Never   Substance Use Topics    Alcohol use: No    Drug use: No         ROS       Objective:   /74   Pulse 73   Ht 152.4 cm (60\")   Wt 83 kg (183 lb)   SpO2 97%   BMI 35.74 kg/m²         Vitals reviewed.   Constitutional:       Appearance: Well-developed and not in distress.   Neck:      Vascular: No JVD.      Trachea: No tracheal deviation.   Pulmonary:      Effort: Pulmonary effort is normal.      Breath sounds: Normal breath sounds. "   Cardiovascular:      Normal rate. Regular rhythm.      Murmurs: There is a grade 2/6 systolic murmur at the URSB.   Pulses:     Intact distal pulses.   Edema:     Peripheral edema absent.   Musculoskeletal:         General: No deformity. Skin:     General: Skin is warm and dry.   Neurological:      Mental Status: Alert and oriented to person, place, and time.         Procedures          Assessment:   Assessment & Plan      Diagnoses and all orders for this visit:    1. Coronary artery disease involving coronary bypass graft of native heart without angina pectoris (Primary), stable.  On aspirin.    2. Primary hypertension, controlled.  On beta-blocker.    3. Pure hypercholesterolemia, stable.  Labs with primary care.  Statin intolerant.      Plan:  Patient is overall stable from cardiac standpoint.  Continue current cardiac medications.  Follow-up in 1 years time or sooner if needed.       VAUGHN Metcalf       Advance Care Planning   ACP discussion was held with the patient during this visit. Patient does not have an advance directive, declines further assistance.        Dictated utilizing Dragon dictation

## 2024-01-29 RX ORDER — ALIROCUMAB 75 MG/ML
INJECTION, SOLUTION SUBCUTANEOUS
Qty: 6 ML | Refills: 3 | Status: SHIPPED | OUTPATIENT
Start: 2024-01-29

## 2024-11-04 NOTE — PROGRESS NOTES
"Subjective:     Encounter Date:2024    Primary Care Physician: Darcy Parry APRN      Patient ID: Karen Cheema is a 84 y.o. female.    Chief Complaint:Follow-up and Coronary Artery Disease      PROBLEM LIST:  Coronary artery disease  Dyspnea, abnormal stress Cardiolite   Cardiac catheter left main and multivessel disease normal LVEF  CABG ×5, Fabian, 16 BERRY to LAD and SVG to diagonal, SVG to OM1, and SVG to PDA and RPL   MPS negative for ischemia  2023 echo EF 60%.  Normal valves.  Dyslipidemia  Diabetes, type II  Asthma  Hypothyroidism  Rosacea  Multiple drug intolerances  Cervical spine arthritis  Surgical history  Cholecystectomy  Bladder surgery  CABG   section  Hysterectomy      Allergies   Allergen Reactions    Fish-Derived Products      \"turned purple and lost my hair\"    Avandia [Rosiglitazone] Swelling    Ciprofloxacin Swelling    Crestor [Rosuvastatin] Myalgia     Muscle stiffness    Egg-Derived Products Nausea Only    Glyburide Swelling    Iodine      After eating fish, \"turned purple\", lost hair--was told that she would be allergic to contrast dye as well    Levaquin [Levofloxacin] Nausea And Vomiting and Swelling    Lipitor [Atorvastatin] Myalgia     Muscle stiffness    Pravastatin Myalgia    Sulfa Antibiotics Nausea Only         Current Outpatient Medications:     aspirin 81 MG tablet, Take 1 tablet by mouth Daily., Disp: 30 tablet, Rfl: 11    coenzyme Q10 100 MG capsule, Take 2 capsules by mouth Daily., Disp: , Rfl:     Empagliflozin (Jardiance) 10 MG tablet, Take 1 tablet by mouth Daily., Disp: , Rfl:     loratadine (CLARITIN) 10 MG tablet, Take 1 tablet by mouth Daily., Disp: , Rfl:     metoprolol tartrate (LOPRESSOR) 25 MG tablet, TAKE ONE-HALF TABLET BY MOUTH EVERY 12 HOURS (Patient taking differently: Take 1 tablet by mouth Daily.), Disp: 90 tablet, Rfl: 1    Praluent 75 MG/ML solution auto-injector, INJECT 75MG(1ML) UNDER THE SKIN EVERY 2 WEEKS AS " "DIRECTED, Disp: 6 mL, Rfl: 3    Semaglutide (OZEMPIC, 1 MG/DOSE, SC), Inject 1 mg under the skin into the appropriate area as directed 1 (One) Time Per Week., Disp: , Rfl:     Chlorcyclizine-Pseudoephed (STAHIST AD PO), Take 1 tablet by mouth Daily., Disp: , Rfl:         History of Present Illness    Patient returns for annual follow-up of coronary disease and risk factors.  Since her last visit, she is overall doing very well.  She is very active without exertional chest pain.  Notes some occasional palpitations that has occurred 3 times since June, lasting a few seconds each.  No associated dizziness lightheadedness presyncope syncope chest pain or shortness of breath.    The following portions of the patient's history were reviewed and updated as appropriate: allergies, current medications, past family history, past medical history, past social history, past surgical history and problem list.      Social History     Tobacco Use    Smoking status: Never    Smokeless tobacco: Never   Substance Use Topics    Alcohol use: No    Drug use: No         ROS       Objective:   /68   Pulse 69   Ht 160 cm (63\")   Wt 82.1 kg (181 lb)   BMI 32.06 kg/m²         Vitals reviewed.   Constitutional:       Appearance: Well-developed and not in distress.   Neck:      Thyroid: No thyromegaly.      Vascular: No carotid bruit or JVD.   Pulmonary:      Breath sounds: Normal breath sounds.   Cardiovascular:      Regular rhythm.      No gallop. No S3 and S4 gallop.   Pulses:     Intact distal pulses.      Carotid: 2+ bilaterally.     Radial: 2+ bilaterally.  Edema:     Peripheral edema absent.   Abdominal:      General: Bowel sounds are normal.      Palpations: Abdomen is soft. There is no abdominal mass.      Tenderness: There is no abdominal tenderness.   Musculoskeletal:         General: No deformity.      Extremities: No clubbing present.Skin:     General: Skin is warm and dry.      Findings: No rash.   Neurological:      " Mental Status: Alert and oriented to person, place, and time.         Procedures          Assessment:   Assessment & Plan      Diagnoses and all orders for this visit:    1. Coronary artery disease involving coronary bypass graft of native heart without angina pectoris (Primary)      1.  Coronary artery disease 8 years status post CABG.  Stable no angina  2.  Dyslipidemia LDL well-controlled on Praluent  3.  Type 2 diabetes well-controlled    Recommendations:  1.  Clinically stable, continue current medical therapy  2.  Revisit annually or as needed symptom change         Advance Care Planning   ACP discussion was held with the patient during this visit. Patient does not have an advance directive, declines further assistance.      Anthony Marmolejo MD    Dictated utilizing Dragon dictation

## 2024-11-05 ENCOUNTER — OFFICE VISIT (OUTPATIENT)
Dept: CARDIOLOGY | Facility: CLINIC | Age: 84
End: 2024-11-05
Payer: MEDICARE

## 2024-11-05 VITALS
SYSTOLIC BLOOD PRESSURE: 106 MMHG | WEIGHT: 181 LBS | HEIGHT: 63 IN | DIASTOLIC BLOOD PRESSURE: 68 MMHG | HEART RATE: 69 BPM | BODY MASS INDEX: 32.07 KG/M2

## 2024-11-05 DIAGNOSIS — I25.810 CORONARY ARTERY DISEASE INVOLVING CORONARY BYPASS GRAFT OF NATIVE HEART WITHOUT ANGINA PECTORIS: Primary | ICD-10-CM

## 2024-11-05 PROCEDURE — 3074F SYST BP LT 130 MM HG: CPT | Performed by: INTERNAL MEDICINE

## 2024-11-05 PROCEDURE — 1159F MED LIST DOCD IN RCRD: CPT | Performed by: INTERNAL MEDICINE

## 2024-11-05 PROCEDURE — 1160F RVW MEDS BY RX/DR IN RCRD: CPT | Performed by: INTERNAL MEDICINE

## 2024-11-05 PROCEDURE — 99213 OFFICE O/P EST LOW 20 MIN: CPT | Performed by: INTERNAL MEDICINE

## 2024-11-05 PROCEDURE — 3078F DIAST BP <80 MM HG: CPT | Performed by: INTERNAL MEDICINE

## 2025-01-20 RX ORDER — ALIROCUMAB 75 MG/ML
75 INJECTION, SOLUTION SUBCUTANEOUS
Qty: 6 ML | Refills: 3 | Status: SHIPPED | OUTPATIENT
Start: 2025-01-20

## 2025-03-24 ENCOUNTER — PRIOR AUTHORIZATION (OUTPATIENT)
Dept: CARDIOLOGY | Facility: CLINIC | Age: 85
End: 2025-03-24
Payer: MEDICARE

## 2025-05-22 ENCOUNTER — TELEPHONE (OUTPATIENT)
Dept: CARDIOLOGY | Facility: CLINIC | Age: 85
End: 2025-05-22

## 2025-05-22 NOTE — TELEPHONE ENCOUNTER
Patient called to request Dr. Marmolejo review Holter monitor results patient had done recently and advise her accordingly.

## 2025-05-23 NOTE — TELEPHONE ENCOUNTER
Spoke with patient, advised Dr. Marmolejo has reviewed her Holter monitor results and he advises she had some asymptomatic atrial tach, not Afib.  Nothing needs to be done regarding these results.  Understanding verbalized.